# Patient Record
Sex: MALE | Race: WHITE | NOT HISPANIC OR LATINO | Employment: FULL TIME | URBAN - METROPOLITAN AREA
[De-identification: names, ages, dates, MRNs, and addresses within clinical notes are randomized per-mention and may not be internally consistent; named-entity substitution may affect disease eponyms.]

---

## 2023-11-24 DIAGNOSIS — R59.1 LYMPHADENOPATHY: Primary | ICD-10-CM

## 2023-11-27 ENCOUNTER — LAB (OUTPATIENT)
Dept: LAB | Facility: HOSPITAL | Age: 53
End: 2023-11-27
Payer: COMMERCIAL

## 2023-11-27 ENCOUNTER — HOSPITAL ENCOUNTER (OUTPATIENT)
Dept: RADIOLOGY | Facility: HOSPITAL | Age: 53
Discharge: HOME | End: 2023-11-27
Payer: COMMERCIAL

## 2023-11-27 ENCOUNTER — OFFICE VISIT (OUTPATIENT)
Dept: HEMATOLOGY/ONCOLOGY | Facility: HOSPITAL | Age: 53
End: 2023-11-27
Payer: COMMERCIAL

## 2023-11-27 VITALS
RESPIRATION RATE: 17 BRPM | DIASTOLIC BLOOD PRESSURE: 99 MMHG | HEART RATE: 63 BPM | SYSTOLIC BLOOD PRESSURE: 148 MMHG | TEMPERATURE: 98.8 F | OXYGEN SATURATION: 98 % | WEIGHT: 206.5 LBS

## 2023-11-27 DIAGNOSIS — M79.651 BILATERAL THIGH PAIN: ICD-10-CM

## 2023-11-27 DIAGNOSIS — M79.652 BILATERAL THIGH PAIN: ICD-10-CM

## 2023-11-27 DIAGNOSIS — M54.50 ACUTE BILATERAL LOW BACK PAIN WITHOUT SCIATICA: ICD-10-CM

## 2023-11-27 DIAGNOSIS — M79.621 PAIN IN RIGHT UPPER ARM: ICD-10-CM

## 2023-11-27 DIAGNOSIS — M54.50 ACUTE BILATERAL LOW BACK PAIN WITHOUT SCIATICA: Primary | ICD-10-CM

## 2023-11-27 DIAGNOSIS — R59.1 LYMPHADENOPATHY: ICD-10-CM

## 2023-11-27 DIAGNOSIS — M79.18 MUSCULOSKELETAL PAIN: ICD-10-CM

## 2023-11-27 DIAGNOSIS — R10.11 RUQ PAIN: ICD-10-CM

## 2023-11-27 LAB
ALBUMIN SERPL BCP-MCNC: 4.6 G/DL (ref 3.4–5)
ALP SERPL-CCNC: 69 U/L (ref 33–120)
ALT SERPL W P-5'-P-CCNC: 44 U/L (ref 10–52)
ANION GAP SERPL CALC-SCNC: 13 MMOL/L (ref 10–20)
AST SERPL W P-5'-P-CCNC: 32 U/L (ref 9–39)
BASOPHILS # BLD AUTO: 0.04 X10*3/UL (ref 0–0.1)
BASOPHILS NFR BLD AUTO: 0.4 %
BILIRUB SERPL-MCNC: 0.7 MG/DL (ref 0–1.2)
BUN SERPL-MCNC: 16 MG/DL (ref 6–23)
CALCIUM SERPL-MCNC: 10.2 MG/DL (ref 8.6–10.3)
CHLORIDE SERPL-SCNC: 103 MMOL/L (ref 98–107)
CO2 SERPL-SCNC: 27 MMOL/L (ref 21–32)
CREAT SERPL-MCNC: 0.93 MG/DL (ref 0.5–1.3)
EOSINOPHIL # BLD AUTO: 0.18 X10*3/UL (ref 0–0.7)
EOSINOPHIL NFR BLD AUTO: 1.9 %
ERYTHROCYTE [DISTWIDTH] IN BLOOD BY AUTOMATED COUNT: 13.5 % (ref 11.5–14.5)
GFR SERPL CREATININE-BSD FRML MDRD: >90 ML/MIN/1.73M*2
GLUCOSE SERPL-MCNC: 87 MG/DL (ref 74–99)
HBV CORE AB SER QL: NONREACTIVE
HBV SURFACE AG SERPL QL IA: NONREACTIVE
HCT VFR BLD AUTO: 41.6 % (ref 41–52)
HGB BLD-MCNC: 14.1 G/DL (ref 13.5–17.5)
HIV 1+2 AB+HIV1 P24 AG SERPL QL IA: NONREACTIVE
IMM GRANULOCYTES # BLD AUTO: 0.08 X10*3/UL (ref 0–0.7)
IMM GRANULOCYTES NFR BLD AUTO: 0.9 % (ref 0–0.9)
LDH SERPL L TO P-CCNC: 166 U/L (ref 84–246)
LYMPHOCYTES # BLD AUTO: 2.56 X10*3/UL (ref 1.2–4.8)
LYMPHOCYTES NFR BLD AUTO: 27.4 %
MCH RBC QN AUTO: 28.9 PG (ref 26–34)
MCHC RBC AUTO-ENTMCNC: 33.9 G/DL (ref 32–36)
MCV RBC AUTO: 85 FL (ref 80–100)
MONOCYTES # BLD AUTO: 0.51 X10*3/UL (ref 0.1–1)
MONOCYTES NFR BLD AUTO: 5.4 %
NEUTROPHILS # BLD AUTO: 5.99 X10*3/UL (ref 1.2–7.7)
NEUTROPHILS NFR BLD AUTO: 64 %
NRBC BLD-RTO: 0 /100 WBCS (ref 0–0)
PLATELET # BLD AUTO: 194 X10*3/UL (ref 150–450)
POTASSIUM SERPL-SCNC: 4.7 MMOL/L (ref 3.5–5.3)
PROT SERPL-MCNC: 7.9 G/DL (ref 6.4–8.2)
RBC # BLD AUTO: 4.88 X10*6/UL (ref 4.5–5.9)
SODIUM SERPL-SCNC: 138 MMOL/L (ref 136–145)
URATE SERPL-MCNC: 6.1 MG/DL (ref 4–7.5)
WBC # BLD AUTO: 9.4 X10*3/UL (ref 4.4–11.3)

## 2023-11-27 PROCEDURE — 80053 COMPREHEN METABOLIC PANEL: CPT

## 2023-11-27 PROCEDURE — 72050 X-RAY EXAM NECK SPINE 4/5VWS: CPT | Mod: FY

## 2023-11-27 PROCEDURE — 86704 HEP B CORE ANTIBODY TOTAL: CPT

## 2023-11-27 PROCEDURE — 99215 OFFICE O/P EST HI 40 MIN: CPT | Performed by: NURSE PRACTITIONER

## 2023-11-27 PROCEDURE — 85025 COMPLETE CBC W/AUTO DIFF WBC: CPT

## 2023-11-27 PROCEDURE — 87340 HEPATITIS B SURFACE AG IA: CPT

## 2023-11-27 PROCEDURE — 72110 X-RAY EXAM L-2 SPINE 4/>VWS: CPT | Mod: FY

## 2023-11-27 PROCEDURE — 87389 HIV-1 AG W/HIV-1&-2 AB AG IA: CPT

## 2023-11-27 PROCEDURE — 99205 OFFICE O/P NEW HI 60 MIN: CPT | Performed by: NURSE PRACTITIONER

## 2023-11-27 PROCEDURE — 83615 LACTATE (LD) (LDH) ENZYME: CPT

## 2023-11-27 PROCEDURE — 1036F TOBACCO NON-USER: CPT | Performed by: NURSE PRACTITIONER

## 2023-11-27 PROCEDURE — 87799 DETECT AGENT NOS DNA QUANT: CPT

## 2023-11-27 PROCEDURE — 84550 ASSAY OF BLOOD/URIC ACID: CPT

## 2023-11-27 PROCEDURE — 36415 COLL VENOUS BLD VENIPUNCTURE: CPT

## 2023-11-27 ASSESSMENT — ENCOUNTER SYMPTOMS
LOSS OF SENSATION IN FEET: 0
OCCASIONAL FEELINGS OF UNSTEADINESS: 0
DEPRESSION: 0

## 2023-11-27 ASSESSMENT — PAIN SCALES - GENERAL: PAINLEVEL: 5

## 2023-11-27 NOTE — PROGRESS NOTES
"Patient ID: Freedom Yañez is a 53 y.o. male.  Referring Physician: Charo Naik, APRN-CNP  No address on file  Primary Care Provider: No primary care provider on file.      Subjective    Mr. Yañez is a 53 year old male who presents to the [facility] for evaluation of lymphadenopathy.     About 4 months ago, he developed testicular pain. The pain spread into his L inguinal area and then into his R inguinal area. Pain now shoots down the front of his thighs. His thighs \"feel like they are going to explode.\"   He had AP CT evaluation on 10/5/23 without clear cause for his symptoms.   He had an EBV panel that yielded a positive result. PCP felt symptoms may be due to this.   On 11/7/23, he developed bilateral flank pain. He was concerned about his kidneys and went to the ED for evaluation. He had CT evaluation on 11/7 that did not reveal the cause for his symptoms.  The patient presents to our office today because the ER MD felt lymph nodes on physical exam.   He now has persistent pain in his bilateral inguinal areas radiating into his thighs, his back and now has pain in his R shoulder area radiating down his upper arm (not in the joint). Pain is more severe with lifting.   He has also been evaluated by orthopedics (Dr. Saleem at [facility]) for his knee and thigh pain. He had MRI done that are not available for review at the time of this visit (? MRI L knee).     He denies constitutional symptoms. No fever, chills or night sweats. No lymphadenopathy or masses. No early satiety. No malaise. Normal level of energy. Stable weight.     PMH: None  PSH: Appendectomy in childhood. R tennis elbow surgery  Social history: . Works in the oil/gas industry.   Family history: maternal grandfather with lymphoma  Allergies: NKDA  Meds: None currently   Smoking: no  ETOH: rare  Illicit drugs: No          Review of Systems   Constitutional: Negative.    HENT:  Negative.     Eyes: Negative.  "   Respiratory: Negative.     Cardiovascular: Negative.    Gastrointestinal: Negative.    Endocrine: Negative.    Genitourinary: Negative.     Musculoskeletal:  Positive for back pain.        Back pain, inguinal pain, thigh pain, R upper arm pain.    Skin: Negative.    Neurological: Negative.    Hematological: Negative.    Psychiatric/Behavioral:  Positive for sleep disturbance.         Objective   BSA: There is no height or weight on file to calculate BSA.  BP (!) 148/99   Pulse 63   Temp 37.1 °C (98.8 °F) (Skin)   Resp 17   Wt 93.7 kg (206 lb 8 oz)   SpO2 98%     No family history on file.  Oncology History    No history exists.       Freedom Yañez  reports that he has never smoked. He has been exposed to tobacco smoke. He has never used smokeless tobacco.  He  reports that he does not currently use alcohol.  He  reports no history of drug use.    Physical Exam  Vitals reviewed.   Constitutional:       Appearance: Normal appearance.   HENT:      Head: Normocephalic and atraumatic.      Nose: Nose normal.      Mouth/Throat:      Mouth: Mucous membranes are moist.      Pharynx: Oropharynx is clear.   Eyes:      Extraocular Movements: Extraocular movements intact.      Conjunctiva/sclera: Conjunctivae normal.      Pupils: Pupils are equal, round, and reactive to light.   Cardiovascular:      Rate and Rhythm: Normal rate and regular rhythm.      Pulses: Normal pulses.      Heart sounds: Normal heart sounds.   Pulmonary:      Effort: Pulmonary effort is normal.      Breath sounds: Normal breath sounds.   Abdominal:      General: Bowel sounds are normal.      Palpations: Abdomen is soft.      Tenderness: There is abdominal tenderness (RUQ tenderness).   Musculoskeletal:         General: Tenderness (Spinal tendernss with palpation mid and lower back) present. Normal range of motion.      Cervical back: Normal range of motion.   Lymphadenopathy:      Comments: R inguinal fullness noted without clear lymphadenopathy.     Skin:     General: Skin is warm and dry.   Neurological:      General: No focal deficit present.      Mental Status: He is alert and oriented to person, place, and time.   Psychiatric:         Mood and Affect: Mood normal.         Behavior: Behavior normal.         Thought Content: Thought content normal.         Judgment: Judgment normal.         Performance Status:  Symptomatic; fully ambulatory    Assessment/Plan          Problem List Items Addressed This Visit             ICD-10-CM    Musculoskeletal pain M79.18     Patient has unexplained pain that has progressed and worsened in the last 4 months. Now having pain in his bilateral inguinal areas radiating down his thighs, mid and lower back and R shoulder area radiating down arm.   Pain is intense with focal areas of more intense pain. Pain is reproducible on physical exam with no clear abnormality felt by provider.   Has had testicular ultrasound and CT imaging on 10/5 and 11/7 that do not identify a clear cause for his pain. He does not have any lymphadenopathy on CT imaging. He has an area of fullness in the R inguinal area that is associated with intense pain but there is no clear lymphadenopathy on physical exam.   No constitutional symptoms.   No clear concern for lymphoma or other cancers with available testing.   Will obtain plan films of cervical and lumbar spine.   Will obtain lab testing including CBC, CMP, LDH, EBV DNA PCR, HIV.   Will add PSA, CK to his ordered testing.   Will obtain outside records for review.   Recommend that the patient have a follow up appt with his orthopedic team to evaluate for a possible orthopedic cause for his symptoms.   Discussed with Dr. Yañez. There is no lymphadenopathy on CT imaging and no constitutional symptoms. No need to follow up in our office.          Relevant Orders    Creatine Kinase    RUQ pain R10.11     Unclear etiology  Liver function testing normal  Liver is not palpable on exam.   CT imaging shows  fatty infiltration of the liver.   Can follow up with PCP          Other Visit Diagnoses         Codes    Acute bilateral low back pain without sciatica    -  Primary M54.50    Relevant Orders    XR entire spine 6+ views    Prostate Specific Antigen    Creatine Kinase    Lymphadenopathy     R59.1    Relevant Orders    XR entire spine 6+ views    Prostate Specific Antigen    Creatine Kinase    Pain in right upper arm     M79.621    Relevant Orders    XR entire spine 6+ views    Prostate Specific Antigen    Creatine Kinase    Bilateral thigh pain     M79.651, M79.652    Relevant Orders    XR entire spine 6+ views    Prostate Specific Antigen    Creatine Kinase                   Suzy Aden, APRN-CNP

## 2023-11-28 PROBLEM — M79.18 MUSCULOSKELETAL PAIN: Status: ACTIVE | Noted: 2023-11-28

## 2023-11-28 PROBLEM — R10.11 RUQ PAIN: Status: ACTIVE | Noted: 2023-11-28

## 2023-11-28 LAB
EBV DNA SPEC NAA+PROBE-LOG#: NORMAL {LOG_COPIES}/ML
LABORATORY COMMENT REPORT: NOT DETECTED

## 2023-11-28 ASSESSMENT — ENCOUNTER SYMPTOMS
SLEEP DISTURBANCE: 1
CARDIOVASCULAR NEGATIVE: 1
GASTROINTESTINAL NEGATIVE: 1
ENDOCRINE NEGATIVE: 1
CONSTITUTIONAL NEGATIVE: 1
BACK PAIN: 1
NEUROLOGICAL NEGATIVE: 1
RESPIRATORY NEGATIVE: 1
EYES NEGATIVE: 1
HEMATOLOGIC/LYMPHATIC NEGATIVE: 1

## 2023-11-28 NOTE — ASSESSMENT & PLAN NOTE
Unclear etiology  Liver function testing normal  Liver is not palpable on exam.   CT imaging shows fatty infiltration of the liver.   Can follow up with PCP

## 2023-11-28 NOTE — ASSESSMENT & PLAN NOTE
Patient has unexplained pain that has progressed and worsened in the last 4 months. Now having pain in his bilateral inguinal areas radiating down his thighs, mid and lower back and R shoulder area radiating down arm.   Pain is intense with focal areas of more intense pain. Pain is reproducible on physical exam with no clear abnormality felt by provider.   Has had testicular ultrasound and CT imaging on 10/5 and 11/7 that do not identify a clear cause for his pain. He does not have any lymphadenopathy on CT imaging. He has an area of fullness in the R inguinal area that is associated with intense pain but there is no clear lymphadenopathy on physical exam.   No constitutional symptoms.   No clear concern for lymphoma or other cancers with available testing.   Will obtain plan films of cervical and lumbar spine.   Will obtain lab testing including CBC, CMP, LDH, EBV DNA PCR, HIV.   Will add PSA, CK to his ordered testing.   Will obtain outside records for review.   Recommend that the patient have a follow up appt with his orthopedic team to evaluate for a possible orthopedic cause for his symptoms.   Discussed with Dr. Yañez. There is no lymphadenopathy on CT imaging and no constitutional symptoms. No need to follow up in our office.